# Patient Record
Sex: MALE | Race: BLACK OR AFRICAN AMERICAN | NOT HISPANIC OR LATINO | Employment: FULL TIME | URBAN - METROPOLITAN AREA
[De-identification: names, ages, dates, MRNs, and addresses within clinical notes are randomized per-mention and may not be internally consistent; named-entity substitution may affect disease eponyms.]

---

## 2022-12-01 ENCOUNTER — OFFICE VISIT (OUTPATIENT)
Dept: URGENT CARE | Facility: URGENT CARE | Age: 37
End: 2022-12-01

## 2022-12-01 VITALS
OXYGEN SATURATION: 100 % | HEART RATE: 66 BPM | DIASTOLIC BLOOD PRESSURE: 71 MMHG | TEMPERATURE: 98.3 F | WEIGHT: 191.9 LBS | SYSTOLIC BLOOD PRESSURE: 114 MMHG

## 2022-12-01 DIAGNOSIS — J06.9 VIRAL URI WITH COUGH: Primary | ICD-10-CM

## 2022-12-01 LAB
FLUAV AG SPEC QL IA: NEGATIVE
FLUBV AG SPEC QL IA: NEGATIVE

## 2022-12-01 PROCEDURE — 87804 INFLUENZA ASSAY W/OPTIC: CPT | Performed by: PHYSICIAN ASSISTANT

## 2022-12-01 PROCEDURE — 99203 OFFICE O/P NEW LOW 30 MIN: CPT | Performed by: PHYSICIAN ASSISTANT

## 2022-12-01 RX ORDER — BENZONATATE 200 MG/1
200 CAPSULE ORAL 3 TIMES DAILY PRN
Qty: 30 CAPSULE | Refills: 0 | Status: SHIPPED | OUTPATIENT
Start: 2022-12-01 | End: 2022-12-11

## 2022-12-01 RX ORDER — IBUPROFEN 600 MG/1
600 TABLET, FILM COATED ORAL EVERY 6 HOURS PRN
Qty: 30 TABLET | Refills: 0 | Status: SHIPPED | OUTPATIENT
Start: 2022-12-01

## 2022-12-01 ASSESSMENT — ENCOUNTER SYMPTOMS
SINUS PAIN: 0
HEADACHES: 1
FATIGUE: 0
GASTROINTESTINAL NEGATIVE: 1
FEVER: 1
ARTHRALGIAS: 1
PALPITATIONS: 0
SORE THROAT: 0
CARDIOVASCULAR NEGATIVE: 1
SHORTNESS OF BREATH: 0
CHILLS: 0
SINUS PRESSURE: 0
WHEEZING: 1
COUGH: 1
RHINORRHEA: 1
CHEST TIGHTNESS: 0

## 2022-12-01 NOTE — LETTER
[facility] URGENT CARE [address] [address]    2022    Re: Austin Mcallister Jr.  [address]   [facility] [address]  [phone] (home)     : 1985      To Whom It May Concern:      Austin Mcallister was seen in urgent care clinic today and is unable to work until covid results are back.  Please feel free to contact me via phone if you have any questions or concerns.        Sincerely,      Milvia See JAQUELIN Chong

## 2022-12-01 NOTE — PROGRESS NOTES
Nancy Avila is a 37 year old, presenting for the following health issues:  Urgent Care (Started on Sunday woke up with body ache, cough, runny nose, had a headache-stopped yesterday, had a fever-stopped yesterday, denies sore throat ), Cough, Generalized Body Aches, and Nasal Congestion    HPI   Acute Illness  Acute illness concerns:   Onset/Duration: 4days  Symptoms:  Fever: YES  Chills/Sweats: No  Headache (location?): YES  Sinus Pressure: No  Conjunctivitis:  No  Ear Pain: no  Rhinorrhea: YES  Congestion: No  Sore Throat: No  Cough: YES-productive of yellow sputum  Wheeze: YES  Decreased Appetite: No  Nausea: No  Vomiting: No  Diarrhea: YES  Dysuria/Freq.: No  Dysuria or Hematuria: No  Fatigue/Achiness: YES  Sick/Strep Exposure: Ill contacts at home.  No pmh of asthma.  Non-smoker.  Therapies tried and outcome: rest, fluids, pseudofed with minimal relief  There is no problem list on file for this patient.    No current outpatient medications on file.     No current facility-administered medications for this visit.      No Known Allergies    Review of Systems   Constitutional: Positive for fever. Negative for chills and fatigue.   HENT: Positive for congestion and rhinorrhea. Negative for ear discharge, ear pain, hearing loss, sinus pressure, sinus pain and sore throat.    Respiratory: Positive for cough and wheezing. Negative for chest tightness and shortness of breath.    Cardiovascular: Negative.  Negative for chest pain, palpitations and peripheral edema.   Gastrointestinal: Negative.    Musculoskeletal: Positive for arthralgias.   Neurological: Positive for headaches.   All other systems reviewed and are negative.           Objective    /71 (BP Location: Right arm, Patient Position: Sitting, Cuff Size: Adult Large)   Pulse 66   Temp 98.3  F (36.8  C) (Oral)   Wt 87 kg (191 lb 14.4 oz)   SpO2 100%   There is no height or weight on file to calculate BMI.  Physical Exam  Vitals and nursing  note reviewed.   Constitutional:       General: He is not in acute distress.     Appearance: Normal appearance. He is normal weight. He is not ill-appearing.   HENT:      Head: Normocephalic and atraumatic.      Ears:      Comments: TMs are intact without any erythema or bulging bilaterally.  Airway is patent.     Nose: Nose normal.      Mouth/Throat:      Lips: Pink.      Mouth: Mucous membranes are moist.      Pharynx: Oropharynx is clear. Uvula midline. No pharyngeal swelling, oropharyngeal exudate, posterior oropharyngeal erythema or uvula swelling.      Tonsils: No tonsillar exudate or tonsillar abscesses.   Eyes:      General: No scleral icterus.     Conjunctiva/sclera: Conjunctivae normal.      Pupils: Pupils are equal, round, and reactive to light.   Neck:      Thyroid: No thyromegaly.   Cardiovascular:      Rate and Rhythm: Normal rate and regular rhythm.      Pulses: Normal pulses.      Heart sounds: Normal heart sounds, S1 normal and S2 normal. No murmur heard.    No friction rub. No gallop.   Pulmonary:      Effort: Pulmonary effort is normal. No tachypnea, accessory muscle usage, respiratory distress or retractions.      Breath sounds: Normal breath sounds and air entry. No stridor. No decreased breath sounds, wheezing, rhonchi or rales.   Musculoskeletal:      Cervical back: Normal range of motion and neck supple.   Lymphadenopathy:      Cervical: No cervical adenopathy.   Skin:     General: Skin is warm and dry.      Findings: No rash.   Neurological:      Mental Status: He is alert and oriented to person, place, and time.   Psychiatric:         Mood and Affect: Mood normal.         Behavior: Behavior normal.         Thought Content: Thought content normal.         Judgment: Judgment normal.       Results for orders placed or performed in visit on 12/01/22 (from the past 24 hour(s))   Influenza A & B Antigen - Clinic Collect    Specimen: Nose; Swab   Result Value Ref Range    Influenza A antigen  Negative Negative    Influenza B antigen Negative Negative    Narrative    Test results must be correlated with clinical data. If necessary, results should be confirmed by a molecular assay or viral culture.       Assessment/Plan:  Viral URI with cough:  Rapid flu was negative.  He has covid testing scheduled today elsewhere.  Will give tessalon perles as needed for cough.  Tylenol/ibuprofen as needed for pain/fever, rest, fluids, chicken soup.  Recommend self quarantine pending results.  To the urgent care/ER if worsening cough, shortness of breath, wheezing, fevers or chest pain.  -     Influenza A & B Antigen - Clinic Collect  -     benzonatate (TESSALON) 200 MG capsule; Take 1 capsule (200 mg) by mouth 3 times daily as needed for cough  -     ibuprofen (ADVIL/MOTRIN) 600 MG tablet; Take 1 tablet (600 mg) by mouth every 6 hours as needed for moderate pain (4-6)        Milvia Chong PA-C

## 2023-02-12 ENCOUNTER — HEALTH MAINTENANCE LETTER (OUTPATIENT)
Age: 38
End: 2023-02-12

## 2024-03-10 ENCOUNTER — HEALTH MAINTENANCE LETTER (OUTPATIENT)
Age: 39
End: 2024-03-10

## 2025-03-16 ENCOUNTER — HEALTH MAINTENANCE LETTER (OUTPATIENT)
Age: 40
End: 2025-03-16

## 2025-04-14 ENCOUNTER — TRANSCRIBE ORDERS (OUTPATIENT)
Dept: OTHER | Age: 40
End: 2025-04-14

## 2025-04-14 DIAGNOSIS — M54.10 RADICULAR PAIN OF LEFT LOWER EXTREMITY: Primary | ICD-10-CM

## 2025-04-14 DIAGNOSIS — M54.30 SCIATICA: ICD-10-CM

## 2025-04-30 ENCOUNTER — THERAPY VISIT (OUTPATIENT)
Dept: PHYSICAL THERAPY | Facility: CLINIC | Age: 40
End: 2025-04-30
Attending: FAMILY MEDICINE
Payer: COMMERCIAL

## 2025-04-30 DIAGNOSIS — M54.42 ACUTE LEFT-SIDED LOW BACK PAIN WITH LEFT-SIDED SCIATICA: Primary | ICD-10-CM

## 2025-04-30 PROCEDURE — 97161 PT EVAL LOW COMPLEX 20 MIN: CPT | Mod: GP

## 2025-04-30 PROCEDURE — 97110 THERAPEUTIC EXERCISES: CPT | Mod: GP

## 2025-04-30 NOTE — PROGRESS NOTES
"PHYSICAL THERAPY EVALUATION  Type of Visit: Evaluation       Fall Risk Screen:  Have you fallen 2 or more times in the past year?: No  Have you fallen and had an injury in the past year?: No  Is patient receiving Physical Therapy Services?: No    Subjective         Presenting condition or subjective complaint:    Date of onset: 04/09/25    Relevant medical history:     Dates & types of surgery:      Prior diagnostic imaging/testing results:       Prior therapy history for the same diagnosis, illness or injury:        Prior Level of Function  Transfers:   Ambulation:   ADL:   IADL:     Living Environment  Social support:     Type of home:     Stairs to enter the home:         Ramp:     Stairs inside the home:         Help at home:    Equipment owned:       Employment:      Hobbies/Interests:      Patient goals for therapy:      PHYSICAL THERAPY LUMBAR EVALUATION    SUBJECTIVE:  Austin is a 40 year old male who reports that he woke up one morning and he couldn't walk or get out of bed. This lasted about 2 weeks and was traveling down his leg.  Now it's way better. He reports this week is when it's been \"way better\"    Patient reports symptoms of:  Pain and Range of Motion Loss    Patient report of Pain:  Pain Rating Now: 0/10  Pain Rating at Best: 0/10  Pain Rating at Worst: 0/10  Pain Location: Left Low Back and Left Lateral Back  Pain Quality/Description: Sharp  Pain Better with: With time  Pain Worse with: Everything bothered it  Progression of Symptoms: improved    Patient reports Red Flags symptoms of:  None    OBJECTIVE:  Seated Lower Extremity Manual Muscle Test / Myotome Assessment:  Hip Flexion (L2): Right Leg 5/5, Left Leg 5/5  Knee Extension (L3): Right Leg 5/5, Left Leg 5/5  Ankle Dorsiflexion (L4): Right Leg 5/5, Left Leg 5/5  Great Toe (L5): Right Leg 5/5, Left Leg 5/5  Ankle Plantarflexion (S1): Right Leg 5/5, Left Leg 5/5  Knee Flexion (S2): Right Leg 5/5, Left Leg 5/5    Seated Neural Tension " Assessment:   Slump Test: Right Leg: Negative Slump Test, Left Leg: Negative Slump Test    Standing Lumbar Active ROM:  Standing Lumbar Flexion (Hands slide down thighs): Fingertips to Mid Shins  Standing Lumbar Extension: No Restriction and WNL   Lumbar Rotation: Mild limitation bilaterally    Isrrael PARIKH Repeated Movements Lumbar Assessment:  Repeated Lumbar Flexion in Standing: No Effect  Repeated Lumbar Extension with Prone Lying: No Effect  Repeated Lumbar Extension with Prone on Elbows: No Effect  Repeated Lumbar Extension with Prone Press Up: No Effect      Isrrael PARIKH Classification: Other / Inconclusive: Symptoms have resolved         ASSESSMENT:  Austin is a 40 year old male referred to Physical Therapy for Left Low back pain with left radicular pain   from [facility].  Austin demonstrates findings of Pain that justify a need for formal Physical Therapy. These impairments interfere with their ability to perform self care tasks, work tasks, recreational activities, household chores, driving , household mobility, and community mobility as compared to their previous level of function.    Medical Diagnosis: Left Low back pain with left radicular pain    Treatment Diagnosis: Left low back pain with left sciatica     Clinical Decision Making (Complexity):  Clinical Presentation: Stable/Uncomplicated  Clinical Presentation Rationale: based on medical and personal factors listed in PT evaluation  Clinical Decision Making (Complexity): Low complexity      PHYSICAL THERAPY PLAN OF CARE:  Treatment Interventions:  Interventions: Therapeutic Exercise    Long Term Goals     PT Goal 1  Goal Identifier: Mobility HEP  Goal Description: Austin will be independent with a mobility HEP for preventing low back pain as well as managing if symptoms return  Rationale: to maximize safety and independence with performance of ADLs and functional tasks;to maximize safety and independence within the home;to maximize safety and  independence within the community;to maximize safety and independence with transportation;to maximize safety and independence with self cares  Goal Progress: Demonstrated independence. Will perform at home  Target Date: 04/30/25  Date Met: 04/30/25    Frequency of Treatment: 1 evaluation and treatment  Duration of Treatment: 1 evaluation and treatment         Risks and benefits of evaluation/treatment have been explained.   Patient/Family/caregiver agrees with Plan of Care.      Evaluation Time:     PT Eval, Low Complexity Minutes (21050): 17         Signing Clinician: Dashawn Goode PT        SUMMARY OF PLAN OF CARE:  Austin experienced left sided low back pain 3 weeks ago. While waiting for his therapy appointment, his symptoms naturally resolved and he has been painfree this week. His strength, ROM and neural tension are normal with no directional preference. General mobility exercises were provided to him to help manage his symptoms if they return as well as prevent symptoms. We also discussed the value of general exercise for low back pain and utilization of therapy or chiropractors. We did not schedule more therapy appointments, but recommend he return if his symptoms return.